# Patient Record
Sex: MALE | Race: WHITE | NOT HISPANIC OR LATINO | Employment: FULL TIME | URBAN - METROPOLITAN AREA
[De-identification: names, ages, dates, MRNs, and addresses within clinical notes are randomized per-mention and may not be internally consistent; named-entity substitution may affect disease eponyms.]

---

## 2018-03-04 LAB — RAPID GROUP A STREP (OHS): NEGATIVE

## 2018-03-07 LAB — RAPID GROUP A STREP (OHS): NEGATIVE

## 2018-07-23 ENCOUNTER — HISTORICAL (OUTPATIENT)
Dept: URGENT CARE | Facility: CLINIC | Age: 33
End: 2018-07-23

## 2018-07-23 LAB
ABS NEUT (OLG): 3.76 X10(3)/MCL (ref 2.1–9.2)
ALBUMIN SERPL-MCNC: 3.9 GM/DL (ref 3.4–5)
ALBUMIN/GLOB SERPL: 1.1 RATIO (ref 1.1–2)
ALP SERPL-CCNC: 64 UNIT/L (ref 50–136)
ALT SERPL-CCNC: 41 UNIT/L (ref 12–78)
AST SERPL-CCNC: 19 UNIT/L (ref 15–37)
BASOPHILS # BLD AUTO: 0 X10(3)/MCL (ref 0–0.2)
BASOPHILS NFR BLD AUTO: 0 %
BILIRUB SERPL-MCNC: 0.2 MG/DL (ref 0.2–1)
BILIRUBIN DIRECT+TOT PNL SERPL-MCNC: 0.1 MG/DL (ref 0–0.5)
BILIRUBIN DIRECT+TOT PNL SERPL-MCNC: 0.1 MG/DL (ref 0–0.8)
BUN SERPL-MCNC: 19 MG/DL (ref 7–18)
CALCIUM SERPL-MCNC: 8.9 MG/DL (ref 8.5–10.1)
CHLORIDE SERPL-SCNC: 108 MMOL/L (ref 98–107)
CHOLEST SERPL-MCNC: 135 MG/DL (ref 0–200)
CHOLEST/HDLC SERPL: 3.1 {RATIO} (ref 0–5)
CO2 SERPL-SCNC: 27 MMOL/L (ref 21–32)
CREAT SERPL-MCNC: 0.93 MG/DL (ref 0.7–1.3)
EOSINOPHIL # BLD AUTO: 0.2 X10(3)/MCL (ref 0–0.9)
EOSINOPHIL NFR BLD AUTO: 3 %
ERYTHROCYTE [DISTWIDTH] IN BLOOD BY AUTOMATED COUNT: 12.7 % (ref 11.5–17)
GLOBULIN SER-MCNC: 3.7 GM/DL (ref 2.4–3.5)
GLUCOSE SERPL-MCNC: 94 MG/DL (ref 74–106)
HCT VFR BLD AUTO: 45 % (ref 42–52)
HDLC SERPL-MCNC: 43 MG/DL (ref 35–60)
HGB BLD-MCNC: 14.7 GM/DL (ref 14–18)
LDLC SERPL CALC-MCNC: 70 MG/DL (ref 0–129)
LYMPHOCYTES # BLD AUTO: 1.7 X10(3)/MCL (ref 0.6–4.6)
LYMPHOCYTES NFR BLD AUTO: 27 %
MCH RBC QN AUTO: 28.6 PG (ref 27–31)
MCHC RBC AUTO-ENTMCNC: 32.7 GM/DL (ref 33–36)
MCV RBC AUTO: 87.5 FL (ref 80–94)
MONOCYTES # BLD AUTO: 0.5 X10(3)/MCL (ref 0.1–1.3)
MONOCYTES NFR BLD AUTO: 9 %
NEUTROPHILS # BLD AUTO: 3.76 X10(3)/MCL (ref 1.4–7.9)
NEUTROPHILS NFR BLD AUTO: 60 %
PLATELET # BLD AUTO: 217 X10(3)/MCL (ref 130–400)
PMV BLD AUTO: 10.9 FL (ref 9.4–12.4)
POTASSIUM SERPL-SCNC: 4.2 MMOL/L (ref 3.5–5.1)
PROT SERPL-MCNC: 7.6 GM/DL (ref 6.4–8.2)
RBC # BLD AUTO: 5.14 X10(6)/MCL (ref 4.7–6.1)
SODIUM SERPL-SCNC: 142 MMOL/L (ref 136–145)
T4 FREE SERPL-MCNC: 0.99 NG/DL (ref 0.76–1.46)
TRIGL SERPL-MCNC: 108 MG/DL (ref 30–150)
TSH SERPL-ACNC: 0.3 MIU/L (ref 0.36–3.74)
VLDLC SERPL CALC-MCNC: 22 MG/DL
WBC # SPEC AUTO: 6.2 X10(3)/MCL (ref 4.5–11.5)

## 2022-04-10 ENCOUNTER — HISTORICAL (OUTPATIENT)
Dept: ADMINISTRATIVE | Facility: HOSPITAL | Age: 37
End: 2022-04-10

## 2022-04-26 VITALS
BODY MASS INDEX: 29.27 KG/M2 | OXYGEN SATURATION: 98 % | WEIGHT: 220.88 LBS | SYSTOLIC BLOOD PRESSURE: 130 MMHG | HEIGHT: 73 IN | DIASTOLIC BLOOD PRESSURE: 94 MMHG

## 2022-06-28 ENCOUNTER — OFFICE VISIT (OUTPATIENT)
Dept: URGENT CARE | Facility: CLINIC | Age: 37
End: 2022-06-28

## 2022-06-28 VITALS
BODY MASS INDEX: 32.47 KG/M2 | SYSTOLIC BLOOD PRESSURE: 142 MMHG | OXYGEN SATURATION: 97 % | RESPIRATION RATE: 16 BRPM | TEMPERATURE: 99 F | HEIGHT: 73 IN | HEART RATE: 95 BPM | WEIGHT: 245 LBS | DIASTOLIC BLOOD PRESSURE: 96 MMHG

## 2022-06-28 DIAGNOSIS — J01.40 ACUTE NON-RECURRENT PANSINUSITIS: Primary | ICD-10-CM

## 2022-06-28 DIAGNOSIS — R05.9 COUGH: ICD-10-CM

## 2022-06-28 LAB
CTP QC/QA: YES
FLUAV AG NPH QL: NEGATIVE
FLUBV AG NPH QL: NEGATIVE
S PYO RRNA THROAT QL PROBE: NEGATIVE
SARS-COV-2 RDRP RESP QL NAA+PROBE: NEGATIVE

## 2022-06-28 PROCEDURE — 99203 PR OFFICE/OUTPT VISIT, NEW, LEVL III, 30-44 MIN: ICD-10-PCS | Mod: 25,,, | Performed by: FAMILY MEDICINE

## 2022-06-28 PROCEDURE — 87880 POCT RAPID STREP A: ICD-10-PCS | Mod: QW,,, | Performed by: FAMILY MEDICINE

## 2022-06-28 PROCEDURE — U0002 COVID-19 LAB TEST NON-CDC: HCPCS | Mod: QW,,, | Performed by: FAMILY MEDICINE

## 2022-06-28 PROCEDURE — U0002: ICD-10-PCS | Mod: QW,,, | Performed by: FAMILY MEDICINE

## 2022-06-28 PROCEDURE — 87804 POCT INFLUENZA A/B: ICD-10-PCS | Mod: 59,QW,, | Performed by: FAMILY MEDICINE

## 2022-06-28 PROCEDURE — 87880 STREP A ASSAY W/OPTIC: CPT | Mod: QW,,, | Performed by: FAMILY MEDICINE

## 2022-06-28 PROCEDURE — 96372 THER/PROPH/DIAG INJ SC/IM: CPT | Mod: ,,, | Performed by: FAMILY MEDICINE

## 2022-06-28 PROCEDURE — 96372 PR INJECTION,THERAP/PROPH/DIAG2ST, IM OR SUBCUT: ICD-10-PCS | Mod: ,,, | Performed by: FAMILY MEDICINE

## 2022-06-28 PROCEDURE — 99203 OFFICE O/P NEW LOW 30 MIN: CPT | Mod: 25,,, | Performed by: FAMILY MEDICINE

## 2022-06-28 PROCEDURE — 87804 INFLUENZA ASSAY W/OPTIC: CPT | Mod: QW,,, | Performed by: FAMILY MEDICINE

## 2022-06-28 RX ORDER — AMOXICILLIN AND CLAVULANATE POTASSIUM 875; 125 MG/1; MG/1
1 TABLET, FILM COATED ORAL EVERY 12 HOURS
Qty: 14 TABLET | Refills: 0 | Status: SHIPPED | OUTPATIENT
Start: 2022-06-28 | End: 2022-07-05

## 2022-06-28 RX ORDER — BETAMETHASONE SODIUM PHOSPHATE AND BETAMETHASONE ACETATE 3; 3 MG/ML; MG/ML
6 INJECTION, SUSPENSION INTRA-ARTICULAR; INTRALESIONAL; INTRAMUSCULAR; SOFT TISSUE
Status: COMPLETED | OUTPATIENT
Start: 2022-06-28 | End: 2022-06-28

## 2022-06-28 RX ORDER — TRAZODONE HYDROCHLORIDE 150 MG/1
150 TABLET ORAL NIGHTLY PRN
COMMUNITY
Start: 2022-05-05

## 2022-06-28 RX ORDER — BUPRENORPHINE AND NALOXONE 8; 2 MG/1; MG/1
FILM, SOLUBLE BUCCAL; SUBLINGUAL 2 TIMES DAILY
COMMUNITY
Start: 2022-05-05

## 2022-06-28 RX ADMIN — BETAMETHASONE SODIUM PHOSPHATE AND BETAMETHASONE ACETATE 6 MG: 3; 3 INJECTION, SUSPENSION INTRA-ARTICULAR; INTRALESIONAL; INTRAMUSCULAR; SOFT TISSUE at 03:06

## 2022-06-28 NOTE — PATIENT INSTRUCTIONS
Flonase and saline nasal spray twice a day, antihistamine at bedtime.  Force fluids.  Celestone injection given today. Once fever free for 24 hours can return to regular activities. Cough may linger a few weeks but should not have fever, chest pain, or shortness of breath.     Recommend cessation of tobacco.

## 2022-06-28 NOTE — PROGRESS NOTES
"Subjective:       Patient ID: Zeeshan Liu is a 36 y.o. male.    Vitals:  height is 6' 1" (1.854 m) and weight is 111.1 kg (245 lb). His temperature is 99.4 °F (37.4 °C). His blood pressure is 142/96 (abnormal) and his pulse is 95. His respiration is 16 and oxygen saturation is 97%.     Chief Complaint: Sinus Problem (Last Tuesday started feeling fatigue, ran a 101 temp., sore throat, congestion, mucus, and extreme sinus pain/pressure (more on left side). Pt took Mucinex sinus severe Day/Night, Tylenol, and Advil. )    37 yo M presents for about 7 days of sinus congestion and fever, improved about 3 days ago then worsened with more facial pressure and return of fever that comes and goes throughout the day.  No chest pain, no SOB. Home COVID tests negative.       Constitution: Positive for fever.   HENT: Positive for congestion, postnasal drip, sinus pressure and sore throat.    Cardiovascular: Negative for chest pain and palpitations.   Respiratory: Positive for cough. Negative for chest tightness and shortness of breath.    Gastrointestinal: Negative for nausea and vomiting.       Objective:      Physical Exam   Constitutional: He is oriented to person, place, and time. He appears well-developed. No distress.   HENT:   Head: Normocephalic.   Ears:   Right Ear: Tympanic membrane and external ear normal.   Left Ear: Tympanic membrane and external ear normal.   Nose: Rhinorrhea present.   Mouth/Throat: Uvula is midline and mucous membranes are normal. No uvula swelling. Cobblestoning present. No oropharyngeal exudate or posterior oropharyngeal edema. Tonsils are 0 on the right. Tonsils are 0 on the left. No tonsillar exudate.   Eyes: Pupils are equal, round, and reactive to light. Right eye exhibits no discharge. Left eye exhibits no discharge.   Neck: Neck supple. No tracheal deviation present.   Cardiovascular: Normal rate, regular rhythm and normal heart sounds.   No murmur heard.  Pulmonary/Chest: Effort normal " and breath sounds normal. No stridor. No respiratory distress. He has no wheezes.   Lymphadenopathy:     He has no cervical adenopathy.   Neurological: He is alert and oriented to person, place, and time.   Skin: Skin is warm and dry.   Psychiatric: Thought content normal.   Nursing note and vitals reviewed.        Assessment:       1. Acute non-recurrent pansinusitis    2. Cough          Plan:         Acute non-recurrent pansinusitis  -     amoxicillin-clavulanate 875-125mg (AUGMENTIN) 875-125 mg per tablet; Take 1 tablet by mouth every 12 (twelve) hours. for 7 days  Dispense: 14 tablet; Refill: 0  -     betamethasone acetate-betamethasone sodium phosphate injection 6 mg    Cough  -     POCT COVID-19 Rapid Screening  -     POCT Influenza A/B  -     POCT rapid strep A             Flu test negative.   COVID test negative.   Strep test negative.

## 2022-09-21 ENCOUNTER — HISTORICAL (OUTPATIENT)
Dept: ADMINISTRATIVE | Facility: HOSPITAL | Age: 37
End: 2022-09-21

## 2025-01-05 ENCOUNTER — HOSPITAL ENCOUNTER (EMERGENCY)
Facility: HOSPITAL | Age: 40
Discharge: HOME | End: 2025-01-06
Attending: STUDENT IN AN ORGANIZED HEALTH CARE EDUCATION/TRAINING PROGRAM
Payer: COMMERCIAL

## 2025-01-05 DIAGNOSIS — S09.90XA INJURY OF HEAD, INITIAL ENCOUNTER: ICD-10-CM

## 2025-01-05 DIAGNOSIS — S00.83XA FACIAL CONTUSION, INITIAL ENCOUNTER: ICD-10-CM

## 2025-01-05 DIAGNOSIS — S00.83XA CONTUSION OF FACE, INITIAL ENCOUNTER: ICD-10-CM
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
## 2025-01-05 DIAGNOSIS — S01.81XA FACIAL LACERATION, INITIAL ENCOUNTER: ICD-10-CM

## 2025-01-05 LAB
BASOPHILS # BLD AUTO: 0.06 X10(3)/MCL
BASOPHILS NFR BLD AUTO: 0.6 %
EOSINOPHIL # BLD AUTO: 0.25 X10(3)/MCL (ref 0–0.9)
EOSINOPHIL NFR BLD AUTO: 2.4 %
ERYTHROCYTE [DISTWIDTH] IN BLOOD BY AUTOMATED COUNT: 13.2 % (ref 11.5–17)
HCT VFR BLD AUTO: 40.4 % (ref 42–52)
HGB BLD-MCNC: 13.2 G/DL (ref 14–18)
IMM GRANULOCYTES # BLD AUTO: 0.02 X10(3)/MCL (ref 0–0.04)
IMM GRANULOCYTES NFR BLD AUTO: 0.2 %
LYMPHOCYTES # BLD AUTO: 2.69 X10(3)/MCL (ref 0.6–4.6)
LYMPHOCYTES NFR BLD AUTO: 26.2 %
MCH RBC QN AUTO: 28.9 PG (ref 27–31)
MCHC RBC AUTO-ENTMCNC: 32.7 G/DL (ref 33–36)
MCV RBC AUTO: 88.4 FL (ref 80–94)
MONOCYTES # BLD AUTO: 0.87 X10(3)/MCL (ref 0.1–1.3)
MONOCYTES NFR BLD AUTO: 8.5 %
NEUTROPHILS # BLD AUTO: 6.39 X10(3)/MCL (ref 2.1–9.2)
NEUTROPHILS NFR BLD AUTO: 62.1 %
NRBC BLD AUTO-RTO: 0 %
PLATELET # BLD AUTO: 238 X10(3)/MCL (ref 130–400)
PMV BLD AUTO: 10.6 FL (ref 7.4–10.4)
RBC # BLD AUTO: 4.57 X10(6)/MCL (ref 4.7–6.1)
WBC # BLD AUTO: 10.28 X10(3)/MCL (ref 4.5–11.5)

## 2025-01-05 PROCEDURE — 85025 COMPLETE CBC W/AUTO DIFF WBC: CPT | Performed by: STUDENT IN AN ORGANIZED HEALTH CARE EDUCATION/TRAINING PROGRAM

## 2025-01-05 PROCEDURE — 80053 COMPREHEN METABOLIC PANEL: CPT | Performed by: STUDENT IN AN ORGANIZED HEALTH CARE EDUCATION/TRAINING PROGRAM

## 2025-01-05 PROCEDURE — 99285 EMERGENCY DEPT VISIT HI MDM: CPT | Mod: 25

## 2025-01-05 PROCEDURE — 96374 THER/PROPH/DIAG INJ IV PUSH: CPT

## 2025-01-05 PROCEDURE — 85610 PROTHROMBIN TIME: CPT | Performed by: STUDENT IN AN ORGANIZED HEALTH CARE EDUCATION/TRAINING PROGRAM

## 2025-01-05 PROCEDURE — 96375 TX/PRO/DX INJ NEW DRUG ADDON: CPT

## 2025-01-05 PROCEDURE — 82077 ASSAY SPEC XCP UR&BREATH IA: CPT | Performed by: STUDENT IN AN ORGANIZED HEALTH CARE EDUCATION/TRAINING PROGRAM

## 2025-01-05 PROCEDURE — 85730 THROMBOPLASTIN TIME PARTIAL: CPT | Performed by: STUDENT IN AN ORGANIZED HEALTH CARE EDUCATION/TRAINING PROGRAM

## 2025-01-05 RX ORDER — KETOROLAC TROMETHAMINE 30 MG/ML
15 INJECTION, SOLUTION INTRAMUSCULAR; INTRAVENOUS
Status: COMPLETED | OUTPATIENT
Start: 2025-01-06 | End: 2025-01-05

## 2025-01-05 RX ORDER — ONDANSETRON HYDROCHLORIDE 2 MG/ML
4 INJECTION, SOLUTION INTRAVENOUS
Status: COMPLETED | OUTPATIENT
Start: 2025-01-06 | End: 2025-01-05

## 2025-01-05 RX ORDER — LIDOCAINE HYDROCHLORIDE AND EPINEPHRINE 10; 10 UG/ML; MG/ML
1 INJECTION, SOLUTION INFILTRATION; PERINEURAL ONCE
Status: COMPLETED | OUTPATIENT
Start: 2025-01-06 | End: 2025-01-06

## 2025-01-05 RX ADMIN — ONDANSETRON 4 MG: 2 INJECTION INTRAMUSCULAR; INTRAVENOUS at 11:01

## 2025-01-05 RX ADMIN — KETOROLAC TROMETHAMINE 15 MG: 30 INJECTION, SOLUTION INTRAMUSCULAR at 11:01

## 2025-01-05 NOTE — Clinical Note
"Zeeshan Liu (Darren) was seen and treated in our emergency department on 1/5/2025.  He may return to work on 01/13/2025.       If you have any questions or concerns, please don't hesitate to call.       RN    "

## 2025-01-06 VITALS
DIASTOLIC BLOOD PRESSURE: 93 MMHG | HEIGHT: 72 IN | TEMPERATURE: 99 F | SYSTOLIC BLOOD PRESSURE: 145 MMHG | WEIGHT: 240 LBS | BODY MASS INDEX: 32.51 KG/M2 | OXYGEN SATURATION: 98 % | HEART RATE: 90 BPM | RESPIRATION RATE: 11 BRPM

## 2025-01-06 LAB
ALBUMIN SERPL-MCNC: 4.1 G/DL (ref 3.5–5)
ALBUMIN/GLOB SERPL: 1.2 RATIO (ref 1.1–2)
ALP SERPL-CCNC: 57 UNIT/L (ref 40–150)
ALT SERPL-CCNC: 22 UNIT/L (ref 0–55)
ANION GAP SERPL CALC-SCNC: 9 MEQ/L
APTT PPP: 27.8 SECONDS (ref 23.2–33.7)
AST SERPL-CCNC: 20 UNIT/L (ref 5–34)
BILIRUB SERPL-MCNC: 0.4 MG/DL
BUN SERPL-MCNC: 12.5 MG/DL (ref 8.9–20.6)
CALCIUM SERPL-MCNC: 9.2 MG/DL (ref 8.4–10.2)
CHLORIDE SERPL-SCNC: 105 MMOL/L (ref 98–107)
CO2 SERPL-SCNC: 26 MMOL/L (ref 22–29)
CREAT SERPL-MCNC: 0.84 MG/DL (ref 0.72–1.25)
CREAT/UREA NIT SERPL: 15
ETHANOL SERPL-MCNC: <10 MG/DL
GFR SERPLBLD CREATININE-BSD FMLA CKD-EPI: >60 ML/MIN/1.73/M2
GLOBULIN SER-MCNC: 3.3 GM/DL (ref 2.4–3.5)
GLUCOSE SERPL-MCNC: 106 MG/DL (ref 74–100)
INR PPP: 1
POTASSIUM SERPL-SCNC: 3.8 MMOL/L (ref 3.5–5.1)
PROT SERPL-MCNC: 7.4 GM/DL (ref 6.4–8.3)
PROTHROMBIN TIME: 12.8 SECONDS (ref 12.5–14.5)
SODIUM SERPL-SCNC: 140 MMOL/L (ref 136–145)

## 2025-01-06 PROCEDURE — 25000003 PHARM REV CODE 250: Performed by: STUDENT IN AN ORGANIZED HEALTH CARE EDUCATION/TRAINING PROGRAM

## 2025-01-06 PROCEDURE — 12054 INTMD RPR FACE/MM 7.6-12.5CM: CPT

## 2025-01-06 PROCEDURE — 63600175 PHARM REV CODE 636 W HCPCS: Performed by: STUDENT IN AN ORGANIZED HEALTH CARE EDUCATION/TRAINING PROGRAM

## 2025-01-06 RX ORDER — IBUPROFEN 600 MG/1
600 TABLET ORAL
Status: COMPLETED | OUTPATIENT
Start: 2025-01-06 | End: 2025-01-06

## 2025-01-06 RX ORDER — CEPHALEXIN 500 MG/1
500 CAPSULE ORAL EVERY 12 HOURS
Qty: 10 CAPSULE | Refills: 0 | Status: SHIPPED | OUTPATIENT
Start: 2025-01-06 | End: 2025-01-11

## 2025-01-06 RX ORDER — BUTALBITAL, ACETAMINOPHEN AND CAFFEINE 50; 325; 40 MG/1; MG/1; MG/1
1 TABLET ORAL EVERY 8 HOURS PRN
Qty: 15 TABLET | Refills: 0 | Status: SHIPPED | OUTPATIENT
Start: 2025-01-06 | End: 2025-01-11

## 2025-01-06 RX ORDER — CEPHALEXIN 500 MG/1
500 CAPSULE ORAL
Status: COMPLETED | OUTPATIENT
Start: 2025-01-06 | End: 2025-01-06

## 2025-01-06 RX ORDER — MUPIROCIN 20 MG/G
OINTMENT TOPICAL 3 TIMES DAILY
Qty: 15 G | Refills: 0 | Status: SHIPPED | OUTPATIENT
Start: 2025-01-06 | End: 2025-01-16

## 2025-01-06 RX ORDER — ONDANSETRON 4 MG/1
4 TABLET, ORALLY DISINTEGRATING ORAL EVERY 8 HOURS PRN
Qty: 15 TABLET | Refills: 0 | Status: SHIPPED | OUTPATIENT
Start: 2025-01-06 | End: 2025-01-11

## 2025-01-06 RX ORDER — LIDOCAINE HYDROCHLORIDE AND EPINEPHRINE 10; 10 UG/ML; MG/ML
1 INJECTION, SOLUTION INFILTRATION; PERINEURAL ONCE
Status: COMPLETED | OUTPATIENT
Start: 2025-01-06 | End: 2025-01-06

## 2025-01-06 RX ADMIN — LIDOCAINE HYDROCHLORIDE,EPINEPHRINE BITARTRATE 1 ML: 10; .01 INJECTION, SOLUTION INFILTRATION; PERINEURAL at 12:01

## 2025-01-06 RX ADMIN — IBUPROFEN 600 MG: 600 TABLET, FILM COATED ORAL at 03:01

## 2025-01-06 RX ADMIN — LIDOCAINE HYDROCHLORIDE,EPINEPHRINE BITARTRATE 1 ML: 10; .01 INJECTION, SOLUTION INFILTRATION; PERINEURAL at 01:01

## 2025-01-06 RX ADMIN — CEPHALEXIN 500 MG: 500 CAPSULE ORAL at 03:01

## 2025-01-06 NOTE — DISCHARGE INSTRUCTIONS
Follow-up with the primary care physician.      Follow-up with plastic surgery.      Take antibiotic as prescribed.      You may take ibuprofen or Tylenol as needed for pain.      You may take Fioricet if having severe pain.      Apply antibiotic ointment to wound.      You will need your stitches taken out in approximately 14-15 days.    Return to the emergency department if you have any worsening pain, fever, drainage, or any other symptoms.      Keep wound clean dry.  Keep covered for the next 2-3 days.

## 2025-01-06 NOTE — ED PROVIDER NOTES
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          ED Prescriptions       Medication Sig Dispense Start Date End Date Auth. Provider    cephALEXin (KEFLEX) 500 MG capsule Take 1 capsule (500 mg total) by mouth every 12 (twelve) hours. for 5 days 10 capsule 1/6/2025 1/11/2025 Clinton Gerber MD    butalbital-acetaminophen-caffeine -40 mg (FIORICET, ESGIC) -40 mg per tablet Take 1 tablet by mouth every 8 (eight) hours as needed for Pain. 15 tablet 1/6/2025 1/11/2025 Clinton Gerber MD    mupirocin (BACTROBAN) 2 % ointment Apply topically 3 (three) times daily. for 10 days 15 g 1/6/2025 1/16/2025 Clinton Gerber MD    ondansetron (ZOFRAN-ODT) 4 MG TbDL Take 1 tablet (4 mg total) by mouth every 8 (eight) hours as needed. 15 tablet 1/6/2025 1/11/2025 Clinton Gerber MD          Follow-up Information       Follow up With Specialties Details Why Contact Info    Primary Care  Call in 1 day  Please call [phone] for a primary care provider.    Kwan Moya MD Plastic Surgery   [address]  [address]  [facility] [id number] [phone]      Vazquez Devine MD Plastic Surgery   [address]  [address]  [facility] [id number] [phone]      Darby Cartwright MD Plastic Surgery   [address]  [address]  [facility] [id number] [phone]               Clinton Gerber MD  01/08/25 0708        Clinton Gerber MD  01/08/25 0709

## 2025-01-13 ENCOUNTER — OFFICE VISIT (OUTPATIENT)
Dept: URGENT CARE | Facility: CLINIC | Age: 40
End: 2025-01-13
Payer: COMMERCIAL

## 2025-01-13 VITALS
DIASTOLIC BLOOD PRESSURE: 86 MMHG | HEART RATE: 86 BPM | HEIGHT: 72 IN | RESPIRATION RATE: 18 BRPM | BODY MASS INDEX: 32.51 KG/M2 | OXYGEN SATURATION: 96 % | SYSTOLIC BLOOD PRESSURE: 130 MMHG | TEMPERATURE: 99 F | WEIGHT: 240 LBS

## 2025-01-13 DIAGNOSIS — Z98.890 HISTORY OF REPAIR OF LACERATION: ICD-10-CM

## 2025-01-13 DIAGNOSIS — G44.309 POST-TRAUMATIC HEADACHE, NOT INTRACTABLE, UNSPECIFIED CHRONICITY PATTERN: Primary | ICD-10-CM

## 2025-01-13 PROCEDURE — 99203 OFFICE O/P NEW LOW 30 MIN: CPT | Mod: ,,, | Performed by: FAMILY MEDICINE

## 2025-01-13 RX ORDER — BUTALBITAL, ACETAMINOPHEN AND CAFFEINE 50; 325; 40 MG/1; MG/1; MG/1
1 TABLET ORAL EVERY 6 HOURS PRN
Qty: 15 TABLET | Refills: 0 | Status: SHIPPED | OUTPATIENT
Start: 2025-01-13 | End: 2025-01-13

## 2025-01-13 RX ORDER — BUTALBITAL, ACETAMINOPHEN AND CAFFEINE 50; 325; 40 MG/1; MG/1; MG/1
1 TABLET ORAL EVERY 4 HOURS PRN
COMMUNITY
End: 2025-01-13 | Stop reason: SDUPTHER

## 2025-01-13 RX ORDER — BUPROPION HYDROCHLORIDE 150 MG/1
150 TABLET ORAL
COMMUNITY
Start: 2024-12-30

## 2025-01-13 RX ORDER — BUTALBITAL, ACETAMINOPHEN AND CAFFEINE 50; 325; 40 MG/1; MG/1; MG/1
1 TABLET ORAL EVERY 6 HOURS PRN
Qty: 15 TABLET | Refills: 0 | Status: SHIPPED | OUTPATIENT
Start: 2025-01-13

## 2025-01-13 NOTE — LETTER
January 13, 2025      [facility] [address] [facility] [address]  Phone: [phone]       Patient: Zeeshan Liu   YOB: 1985  Date of Visit: 01/13/2025    To Whom It May Concern:    Yuri Liu  was at [facility] on 01/13/2025. The patient may return to work/school on 1/20/2025 with no restrictions. If you have any questions or concerns, or if I can be of further assistance, please do not hesitate to contact me.    Sincerely,    Earline Arauz MD
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
## 2025-01-14 NOTE — PATIENT INSTRUCTIONS
Discussed in detail on physical findings, encouraged to monitor the symptoms.  Keep the laceration repair area dry and clean.  For migraine headache like symptoms medications as needed and as directed.  Refill on Fioricet.  Encouraged mom and patient to monitor the symptoms closely.  ER precautions with any acute change in symptoms like increasing fever, change in behavior, any acute change in symptoms  Voiced understanding.  Call or return to clinic for any questions

## 2025-01-28 ENCOUNTER — HOSPITAL ENCOUNTER (EMERGENCY)
Facility: HOSPITAL | Age: 40
Discharge: HOME | End: 2025-01-28
Attending: EMERGENCY MEDICINE
Payer: COMMERCIAL

## 2025-01-28 VITALS
SYSTOLIC BLOOD PRESSURE: 152 MMHG | HEART RATE: 79 BPM | RESPIRATION RATE: 18 BRPM | TEMPERATURE: 98 F | DIASTOLIC BLOOD PRESSURE: 93 MMHG | OXYGEN SATURATION: 99 % | HEIGHT: 73 IN | WEIGHT: 235 LBS | BODY MASS INDEX: 31.14 KG/M2

## 2025-01-28 DIAGNOSIS — Z48.02 VISIT FOR SUTURE REMOVAL: Primary | ICD-10-CM

## 2025-01-28 PROCEDURE — 99999 HC NO LEVEL OF SERVICE - ED ONLY: CPT

## 2025-01-28 NOTE — Clinical Note
"Zeeshan Florencecalos Liu was seen and treated in our emergency department on 1/28/2025.  He may return with limitations on 02/04/2025.  Avoid heavy lifting over 25lb, avoid bending/straining.      Sincerely,      Carmen Gramajo FNP    "

## 2025-01-28 NOTE — Clinical Note
"Zeeshan Florenceren" Noe was seen and treated in our emergency department on 1/28/2025.  He may return with limitations on 02/04/2025.       Sincerely,      Carmen Gramajo FNP"
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
